# Patient Record
Sex: FEMALE | Race: WHITE | NOT HISPANIC OR LATINO | Employment: OTHER | URBAN - METROPOLITAN AREA
[De-identification: names, ages, dates, MRNs, and addresses within clinical notes are randomized per-mention and may not be internally consistent; named-entity substitution may affect disease eponyms.]

---

## 2017-02-07 ENCOUNTER — HISTORICAL (OUTPATIENT)
Dept: ADMINISTRATIVE | Facility: HOSPITAL | Age: 77
End: 2017-02-07

## 2017-02-10 ENCOUNTER — HISTORICAL (OUTPATIENT)
Dept: ADMINISTRATIVE | Facility: HOSPITAL | Age: 77
End: 2017-02-10

## 2020-05-27 ENCOUNTER — HISTORICAL (OUTPATIENT)
Dept: ADMINISTRATIVE | Facility: HOSPITAL | Age: 80
End: 2020-05-27

## 2020-05-27 LAB
T4 FREE SERPL-MCNC: 2 NG/DL (ref 0.76–1.46)
TSH SERPL-ACNC: 0 MIU/ML (ref 0.35–4.94)

## 2020-08-06 ENCOUNTER — HISTORICAL (OUTPATIENT)
Dept: ADMINISTRATIVE | Facility: HOSPITAL | Age: 80
End: 2020-08-06

## 2020-08-06 LAB — TSH SERPL-ACNC: 0 MIU/ML (ref 0.35–4.94)

## 2020-12-02 ENCOUNTER — HISTORICAL (OUTPATIENT)
Dept: ADMINISTRATIVE | Facility: HOSPITAL | Age: 80
End: 2020-12-02

## 2020-12-02 LAB
T4 FREE SERPL-MCNC: 1.13 NG/DL (ref 0.76–1.46)
TSH SERPL-ACNC: 0.07 MIU/ML (ref 0.35–4.94)

## 2021-03-02 ENCOUNTER — HISTORICAL (OUTPATIENT)
Dept: ADMINISTRATIVE | Facility: HOSPITAL | Age: 81
End: 2021-03-02

## 2021-03-02 LAB — TSH SERPL-ACNC: 0.24 MIU/ML (ref 0.35–4.94)

## 2021-03-17 ENCOUNTER — HISTORICAL (OUTPATIENT)
Dept: ADMINISTRATIVE | Facility: HOSPITAL | Age: 81
End: 2021-03-17

## 2021-03-17 LAB
ABS NEUT (OLG): 3.1 X10(3)/MCL (ref 2.1–9.2)
ALBUMIN SERPL-MCNC: 4.3 GM/DL (ref 3.4–5)
ALBUMIN/GLOB SERPL: 2.05 {RATIO} (ref 1.5–2.5)
ALP SERPL-CCNC: 57 UNIT/L (ref 38–126)
ALT SERPL-CCNC: 9 UNIT/L (ref 7–52)
APPEARANCE, UA: CLEAR
AST SERPL-CCNC: 15 UNIT/L (ref 15–37)
BACTERIA #/AREA URNS AUTO: ABNORMAL /HPF
BILIRUB SERPL-MCNC: 0.8 MG/DL (ref 0.2–1)
BILIRUB UR QL STRIP: NEGATIVE MG/DL
BILIRUBIN DIRECT+TOT PNL SERPL-MCNC: 0.2 MG/DL (ref 0–0.5)
BILIRUBIN DIRECT+TOT PNL SERPL-MCNC: 0.6 MG/DL
BUN SERPL-MCNC: 11 MG/DL (ref 7–18)
CALCIUM SERPL-MCNC: 9.5 MG/DL (ref 8.5–10)
CHLORIDE SERPL-SCNC: 104 MMOL/L (ref 98–107)
CHOLEST SERPL-MCNC: 194 MG/DL (ref 0–200)
CHOLEST/HDLC SERPL: 3.5 {RATIO}
CO2 SERPL-SCNC: 30 MMOL/L (ref 21–32)
COLOR UR: YELLOW
CREAT SERPL-MCNC: 0.84 MG/DL (ref 0.6–1.3)
ERYTHROCYTE [DISTWIDTH] IN BLOOD BY AUTOMATED COUNT: 12.6 % (ref 11.5–17)
GLOBULIN SER-MCNC: 2.1 GM/DL (ref 1.2–3)
GLUCOSE (UA): NEGATIVE MG/DL
GLUCOSE SERPL-MCNC: 96 MG/DL (ref 74–106)
HCT VFR BLD AUTO: 39.5 % (ref 37–47)
HDLC SERPL-MCNC: 56 MG/DL (ref 35–60)
HGB BLD-MCNC: 13.3 GM/DL (ref 12–16)
HGB UR QL STRIP: NEGATIVE UNIT/L
KETONES UR QL STRIP: NEGATIVE MG/DL
LDLC SERPL CALC-MCNC: 122 MG/DL (ref 0–129)
LEUKOCYTE ESTERASE UR QL STRIP: NEGATIVE UNIT/L
LYMPHOCYTES # BLD AUTO: 1.6 X10(3)/MCL (ref 0.6–3.4)
LYMPHOCYTES NFR BLD AUTO: 30 % (ref 13–40)
MCH RBC QN AUTO: 29.7 PG (ref 27–31.2)
MCHC RBC AUTO-ENTMCNC: 34 GM/DL (ref 32–36)
MCV RBC AUTO: 88 FL (ref 80–94)
MONOCYTES # BLD AUTO: 0.5 X10(3)/MCL (ref 0.1–1.3)
MONOCYTES NFR BLD AUTO: 8.9 % (ref 0.1–24)
NEUTROPHILS NFR BLD AUTO: 61.1 % (ref 47–80)
NITRITE UR QL STRIP.AUTO: NEGATIVE
PH UR STRIP: 8.5 [PH]
PLATELET # BLD AUTO: 243 X10(3)/MCL (ref 130–400)
PMV BLD AUTO: 8 FL (ref 9.4–12.4)
POTASSIUM SERPL-SCNC: 4.2 MMOL/L (ref 3.5–5.1)
PROT SERPL-MCNC: 6.4 GM/DL (ref 6.4–8.2)
PROT UR QL STRIP: NEGATIVE MG/DL
RBC # BLD AUTO: 4.48 X10(6)/MCL (ref 4.2–5.4)
RBC #/AREA URNS HPF: ABNORMAL /HPF
SODIUM SERPL-SCNC: 142 MMOL/L (ref 136–145)
SP GR UR STRIP: 1.02
SQUAMOUS EPITHELIAL, UA: ABNORMAL /LPF
T4 FREE SERPL-MCNC: 1.07 NG/DL (ref 0.76–1.46)
TRIGL SERPL-MCNC: 66 MG/DL (ref 30–150)
UROBILINOGEN UR STRIP-ACNC: 0.2 MG/DL
VLDLC SERPL CALC-MCNC: 13.2 MG/DL
WBC # SPEC AUTO: 5.2 X10(3)/MCL (ref 4.5–11.5)
WBC #/AREA URNS AUTO: ABNORMAL /[HPF]

## 2022-04-10 ENCOUNTER — HISTORICAL (OUTPATIENT)
Dept: ADMINISTRATIVE | Facility: HOSPITAL | Age: 82
End: 2022-04-10

## 2022-04-26 VITALS
OXYGEN SATURATION: 96 % | BODY MASS INDEX: 24.57 KG/M2 | SYSTOLIC BLOOD PRESSURE: 150 MMHG | HEIGHT: 64 IN | WEIGHT: 143.94 LBS | DIASTOLIC BLOOD PRESSURE: 70 MMHG

## 2022-05-25 PROBLEM — M54.9 CHRONIC MIDLINE BACK PAIN: Status: ACTIVE | Noted: 2022-05-25

## 2022-05-25 PROBLEM — R20.2 PARESTHESIAS: Status: ACTIVE | Noted: 2022-05-25

## 2022-05-25 PROBLEM — I50.9 CONGESTIVE HEART FAILURE: Status: ACTIVE | Noted: 2022-05-25

## 2022-05-25 PROBLEM — E03.9 HYPOTHYROIDISM: Status: ACTIVE | Noted: 2022-05-25

## 2022-05-25 PROBLEM — G89.29 CHRONIC MIDLINE BACK PAIN: Status: ACTIVE | Noted: 2022-05-25

## 2022-05-25 PROBLEM — K21.9 GASTROESOPHAGEAL REFLUX DISEASE: Status: ACTIVE | Noted: 2022-05-25

## 2022-06-02 PROCEDURE — 87088 URINE BACTERIA CULTURE: CPT | Performed by: NURSE PRACTITIONER

## 2022-06-07 PROBLEM — Z00.00 MEDICARE ANNUAL WELLNESS VISIT, SUBSEQUENT: Status: ACTIVE | Noted: 2022-06-07

## 2022-07-19 ENCOUNTER — HOSPITAL ENCOUNTER (EMERGENCY)
Facility: HOSPITAL | Age: 82
Discharge: HOME | End: 2022-07-19
Attending: EMERGENCY MEDICINE
Payer: MEDICARE

## 2022-07-19 VITALS
SYSTOLIC BLOOD PRESSURE: 145 MMHG | HEIGHT: 65 IN | HEART RATE: 71 BPM | TEMPERATURE: 98 F | DIASTOLIC BLOOD PRESSURE: 96 MMHG | OXYGEN SATURATION: 99 % | RESPIRATION RATE: 18 BRPM | BODY MASS INDEX: 22.66 KG/M2 | WEIGHT: 136 LBS

## 2022-07-19 DIAGNOSIS — U07.1 COVID-19: Primary | ICD-10-CM

## 2022-07-19 DIAGNOSIS — R53.1 GENERALIZED WEAKNESS: ICD-10-CM

## 2022-07-19 LAB
ALBUMIN SERPL-MCNC: 4.3 GM/DL (ref 3.4–4.8)
ALBUMIN/GLOB SERPL: 1.9 RATIO (ref 1.1–2)
ALP SERPL-CCNC: 63 UNIT/L (ref 40–150)
ALT SERPL-CCNC: 8 UNIT/L (ref 0–55)
APPEARANCE UR: CLEAR
AST SERPL-CCNC: 15 UNIT/L (ref 5–34)
BACTERIA #/AREA URNS AUTO: NORMAL /HPF
BASOPHILS # BLD AUTO: 0.02 X10(3)/MCL (ref 0–0.2)
BASOPHILS NFR BLD AUTO: 0.4 %
BILIRUB UR QL STRIP.AUTO: NEGATIVE MG/DL
BILIRUBIN DIRECT+TOT PNL SERPL-MCNC: 1 MG/DL
BUN SERPL-MCNC: 12.9 MG/DL (ref 9.8–20.1)
CALCIUM SERPL-MCNC: 10.1 MG/DL (ref 8.4–10.2)
CHLORIDE SERPL-SCNC: 104 MMOL/L (ref 98–107)
CO2 SERPL-SCNC: 25 MMOL/L (ref 23–31)
COLOR UR AUTO: YELLOW
CREAT SERPL-MCNC: 0.83 MG/DL (ref 0.55–1.02)
EOSINOPHIL # BLD AUTO: 0.12 X10(3)/MCL (ref 0–0.9)
EOSINOPHIL NFR BLD AUTO: 2.4 %
ERYTHROCYTE [DISTWIDTH] IN BLOOD BY AUTOMATED COUNT: 12.5 % (ref 11.5–17)
FLUAV AG UPPER RESP QL IA.RAPID: NOT DETECTED
FLUBV AG UPPER RESP QL IA.RAPID: NOT DETECTED
GLOBULIN SER-MCNC: 2.3 GM/DL (ref 2.4–3.5)
GLUCOSE SERPL-MCNC: 108 MG/DL (ref 82–115)
GLUCOSE UR QL STRIP.AUTO: NEGATIVE MG/DL
HCT VFR BLD AUTO: 38.7 % (ref 37–47)
HGB BLD-MCNC: 13.9 GM/DL (ref 12–16)
IMM GRANULOCYTES # BLD AUTO: 0.02 X10(3)/MCL (ref 0–0.04)
IMM GRANULOCYTES NFR BLD AUTO: 0.4 %
KETONES UR QL STRIP.AUTO: NEGATIVE MG/DL
LEUKOCYTE ESTERASE UR QL STRIP.AUTO: ABNORMAL UNIT/L
LYMPHOCYTES # BLD AUTO: 1.65 X10(3)/MCL (ref 0.6–4.6)
LYMPHOCYTES NFR BLD AUTO: 32.4 %
MCH RBC QN AUTO: 29.7 PG (ref 27–31)
MCHC RBC AUTO-ENTMCNC: 35.9 MG/DL (ref 33–36)
MCV RBC AUTO: 82.7 FL (ref 80–94)
MONOCYTES # BLD AUTO: 0.66 X10(3)/MCL (ref 0.1–1.3)
MONOCYTES NFR BLD AUTO: 13 %
NEUTROPHILS # BLD AUTO: 2.6 X10(3)/MCL (ref 2.1–9.2)
NEUTROPHILS NFR BLD AUTO: 51.4 %
NITRITE UR QL STRIP.AUTO: NEGATIVE
NRBC BLD AUTO-RTO: 0 %
PH UR STRIP.AUTO: 6 [PH]
PLATELET # BLD AUTO: 284 X10(3)/MCL (ref 130–400)
PMV BLD AUTO: 9.2 FL (ref 7.4–10.4)
POTASSIUM SERPL-SCNC: 3.6 MMOL/L (ref 3.5–5.1)
PROT SERPL-MCNC: 6.6 GM/DL (ref 5.8–7.6)
PROT UR QL STRIP.AUTO: NEGATIVE MG/DL
RBC # BLD AUTO: 4.68 X10(6)/MCL (ref 4.2–5.4)
RBC #/AREA URNS AUTO: <5 /HPF
RBC UR QL AUTO: NEGATIVE UNIT/L
SARS-COV-2 RNA RESP QL NAA+PROBE: DETECTED
SODIUM SERPL-SCNC: 140 MMOL/L (ref 136–145)
SP GR UR STRIP.AUTO: 1.01 (ref 1–1.03)
SQUAMOUS #/AREA URNS AUTO: <5 /HPF
STREP A PCR (OHS): NOT DETECTED
TROPONIN I SERPL-MCNC: <0.01 NG/ML (ref 0–0.04)
UROBILINOGEN UR STRIP-ACNC: 0.2 MG/DL
WBC # SPEC AUTO: 5.1 X10(3)/MCL (ref 4.5–11.5)
WBC #/AREA URNS AUTO: <5 /HPF

## 2022-07-19 PROCEDURE — 84484 ASSAY OF TROPONIN QUANT: CPT | Performed by: EMERGENCY MEDICINE

## 2022-07-19 PROCEDURE — 87636 SARSCOV2 & INF A&B AMP PRB: CPT | Performed by: EMERGENCY MEDICINE

## 2022-07-19 PROCEDURE — 81001 URINALYSIS AUTO W/SCOPE: CPT | Performed by: EMERGENCY MEDICINE

## 2022-07-19 PROCEDURE — 87631 RESP VIRUS 3-5 TARGETS: CPT | Performed by: EMERGENCY MEDICINE

## 2022-07-19 PROCEDURE — 80053 COMPREHEN METABOLIC PANEL: CPT | Performed by: EMERGENCY MEDICINE

## 2022-07-19 PROCEDURE — 99285 EMERGENCY DEPT VISIT HI MDM: CPT | Mod: 25,CS

## 2022-07-19 PROCEDURE — 85025 COMPLETE CBC W/AUTO DIFF WBC: CPT | Performed by: EMERGENCY MEDICINE

## 2022-07-19 PROCEDURE — 36415 COLL VENOUS BLD VENIPUNCTURE: CPT | Performed by: EMERGENCY MEDICINE

## 2022-07-19 RX ORDER — PREDNISONE 20 MG/1
20 TABLET ORAL DAILY
Qty: 5 TABLET | Refills: 0 | Status: SHIPPED | OUTPATIENT
Start: 2022-07-19 | End: 2022-07-20 | Stop reason: SDUPTHER

## 2022-07-20 RX ORDER — PREDNISONE 20 MG/1
20 TABLET ORAL DAILY
Qty: 5 TABLET | Refills: 0 | Status: SHIPPED | OUTPATIENT
Start: 2022-07-20 | End: 2022-07-25

## 2022-07-20 NOTE — ED PROVIDER NOTES
Encounter Date: 7/19/2022       History     Chief Complaint   Patient presents with    Weakness     Presents w/ weakness, body aches, and mild SOB on exertion x 2 weeks.  Dx w/ COVID 7/13. Pt. Also C/O urinary frequency and cloudy urine. Hx hypothyroidism and HLD.      Patient is a 81-year-old female presenting with complaint of generalized weakness, fatigue, body aches and intermittent shortness of breath.  Patient states her  was diagnosed with COVID-19 just a few days ago.  He is currently in the hospital at this campus.  Patient denies any chest pain.  Patient denies any nausea vomiting or abdominal pain.  Patient does states she is having some urinary frequency but denies any dysuria.  Patient denies any trauma.  No headache.  Primary care physician is Dr. Mark Anthony Case.        Review of patient's allergies indicates:  No Known Allergies  Past Medical History:   Diagnosis Date    Posey's esophagus     Bilateral plantar fasciitis     Gastroesophageal reflux disease     Hyperlipidemia     Hypertension     Hypothyroidism     Insomnia     Right shoulder pain      Past Surgical History:   Procedure Laterality Date    BREAST LUMPECTOMY Left 1985    COLONOSCOPY  04/29/2021    Destruction of Right Lens, Percutaneous Approach   02/10/2017    Discission of secondary membranous cataract (opacified posterior lens capsule and/or anterior hyaloid); laser surgery (eg, YAG laser) (1 or more stages  02/10/2017    ESOPHAGOGASTRODUODENOSCOPY      HYSTERECTOMY      THYROIDECTOMY  1985     Family History   Problem Relation Age of Onset    Brain cancer Mother     Stroke Father     Heart attack Father     Alcohol abuse Brother      Social History     Tobacco Use    Smoking status: Former Smoker    Smokeless tobacco: Never Used   Substance Use Topics    Alcohol use: Yes     Alcohol/week: 0.0 - 1.0 standard drinks    Drug use: Never     Review of Systems   Constitutional: Positive for fatigue.    HENT: Negative.    Respiratory: Positive for cough and shortness of breath. Negative for choking, chest tightness, wheezing and stridor.    Cardiovascular: Negative for chest pain and leg swelling.   Gastrointestinal: Negative.    Musculoskeletal: Negative.    Neurological: Negative.        Physical Exam     Initial Vitals [07/19/22 1956]   BP Pulse Resp Temp SpO2   138/77 77 18 97.9 °F (36.6 °C) 97 %      MAP       --         Physical Exam    Nursing note and vitals reviewed.  Constitutional: She appears well-developed and well-nourished.   Patient is alert oriented, she appears comfortable, she is in no apparent distress.   HENT:   Head: Normocephalic and atraumatic.   Neck: Neck supple.   Normal range of motion.  Cardiovascular: Normal rate, regular rhythm, normal heart sounds and intact distal pulses.   Pulmonary/Chest: Breath sounds normal. No respiratory distress. She has no wheezes. She has no rhonchi. She has no rales.   Abdominal: Abdomen is soft. There is no abdominal tenderness. There is no guarding.   Musculoskeletal:         General: Normal range of motion.      Cervical back: Normal range of motion and neck supple.     Neurological: She is alert and oriented to person, place, and time. She has normal strength.   Skin: Skin is warm and dry.   Psychiatric: She has a normal mood and affect. Her behavior is normal. Judgment and thought content normal.         ED Course   Procedures  Labs Reviewed   COMPREHENSIVE METABOLIC PANEL - Abnormal; Notable for the following components:       Result Value    Globulin 2.3 (*)     All other components within normal limits   URINALYSIS, REFLEX TO URINE CULTURE - Abnormal; Notable for the following components:    Leukocyte Esterase, UA 1+ (*)     All other components within normal limits   COVID/FLU A&B PCR - Abnormal; Notable for the following components:    SARS-CoV-2 PCR Detected (*)     All other components within normal limits   TROPONIN I - Normal   STREP GROUP  A BY PCR - Normal   URINALYSIS, MICROSCOPIC - Normal   CBC W/ AUTO DIFFERENTIAL    Narrative:     The following orders were created for panel order CBC auto differential.  Procedure                               Abnormality         Status                     ---------                               -----------         ------                     CBC with Differential[[id number]]                            Final result                 Please view results for these tests on the individual orders.   CBC WITH DIFFERENTIAL          Imaging Results          X-Ray Chest AP Portable (In process)               X-Rays:   Independently Interpreted Readings:   Other Readings:  No acute changes seen on chest x-ray    Medications - No data to display  Medical Decision Making:   Clinical Tests:   Lab Tests: Reviewed  Radiological Study: Reviewed  ED Management:  Patient is COVID positive, O2 sats stable at 97 and 98% room air.  Will DC home             ED Course as of 07/19/22 2209 Tue Jul 19, 2022 2209 Patient is in no apparent distress.  Vital signs stable.  Patient informed of COVID positive result [KG]      ED Course User Index  [KG] Harrison Palacio MD             Clinical Impression:   Final diagnoses:  [U07.1] COVID-19 (Primary)  [R53.1] Generalized weakness          ED Disposition Condition    Discharge Stable        ED Prescriptions     Medication Sig Dispense Start Date End Date Auth. Provider    predniSONE (DELTASONE) 20 MG tablet Take 1 tablet (20 mg total) by mouth once daily. for 5 days 5 tablet 7/19/2022 7/24/2022 Harrison Palacio MD        Follow-up Information     Follow up With Specialties Details Why Contact Info    Mark Anthony Guzmán MD Family Medicine In 2 days  [address]  [facility] [address]  [phone]      [facility] - Emergency Dept Emergency Medicine  As needed, If symptoms worsen [address] [address] [phone]           Harrison Palacio  MD  07/19/22 5967

## 2022-09-12 PROBLEM — Z00.00 MEDICARE ANNUAL WELLNESS VISIT, SUBSEQUENT: Status: RESOLVED | Noted: 2022-06-07 | Resolved: 2022-09-12

## 2022-10-13 PROBLEM — Z00.00 MEDICARE ANNUAL WELLNESS VISIT, SUBSEQUENT: Status: ACTIVE | Noted: 2022-10-13

## 2022-10-13 PROBLEM — Z23 IMMUNIZATION DUE: Status: ACTIVE | Noted: 2022-10-13

## 2023-01-16 PROBLEM — Z00.00 MEDICARE ANNUAL WELLNESS VISIT, SUBSEQUENT: Status: RESOLVED | Noted: 2022-10-13 | Resolved: 2023-01-16

## 2023-04-11 PROBLEM — E78.00 PURE HYPERCHOLESTEROLEMIA: Status: ACTIVE | Noted: 2023-04-11

## 2023-04-27 ENCOUNTER — OFFICE VISIT (OUTPATIENT)
Dept: URGENT CARE | Facility: CLINIC | Age: 83
End: 2023-04-27
Payer: MEDICARE

## 2023-04-27 VITALS
HEART RATE: 81 BPM | HEIGHT: 65 IN | OXYGEN SATURATION: 98 % | DIASTOLIC BLOOD PRESSURE: 84 MMHG | RESPIRATION RATE: 18 BRPM | SYSTOLIC BLOOD PRESSURE: 156 MMHG | BODY MASS INDEX: 21.99 KG/M2 | WEIGHT: 132 LBS | TEMPERATURE: 98 F

## 2023-04-27 DIAGNOSIS — M54.50 ACUTE MIDLINE LOW BACK PAIN WITHOUT SCIATICA: ICD-10-CM

## 2023-04-27 PROCEDURE — 99203 OFFICE O/P NEW LOW 30 MIN: CPT | Mod: ,,, | Performed by: NURSE PRACTITIONER

## 2023-04-27 PROCEDURE — 99203 PR OFFICE/OUTPT VISIT, NEW, LEVL III, 30-44 MIN: ICD-10-PCS | Mod: ,,, | Performed by: NURSE PRACTITIONER

## 2023-04-27 RX ORDER — HYDROCODONE BITARTRATE AND ACETAMINOPHEN 5; 325 MG/1; MG/1
1 TABLET ORAL EVERY 6 HOURS PRN
Qty: 20 TABLET | Refills: 0 | Status: SHIPPED | OUTPATIENT
Start: 2023-04-27 | End: 2023-05-02

## 2023-04-27 RX ORDER — ASPIRIN 81 MG/1
81 TABLET ORAL DAILY
COMMUNITY

## 2023-04-27 RX ORDER — METHYLPREDNISOLONE 4 MG/1
TABLET ORAL
Qty: 21 EACH | Refills: 0 | Status: SHIPPED | OUTPATIENT
Start: 2023-04-27 | End: 2023-05-02

## 2023-04-27 NOTE — PROGRESS NOTES
"Subjective:      Patient ID: Evon Roth is a 82 y.o. female.    Vitals:  height is 5' 5" (1.651 m) and weight is 59.9 kg (132 lb). Her oral temperature is 98.4 °F (36.9 °C). Her blood pressure is 156/84 (abnormal) and her pulse is 81. Her respiration is 18 and oxygen saturation is 98%.     Chief Complaint: Back Pain (Mid-back pain, muscle spasms, recurrent problem, this time for one week)    82-year-old female presents with low back pain.  Onset 2 days ago.  No recent injury or trauma.  States feels like a muscle spasm.  No dysuria, urinary frequency or urgency.  Same type of muscle spasm feeling in the low back similar to discomfort several months ago.  Norco was prescribed by PCP with relief for acute pain, no chronic history of back pain.    Musculoskeletal:  Positive for pain (low back).    Objective:     Physical Exam   Constitutional: She is oriented to person, place, and time. She appears well-developed. She is cooperative. No distress.   HENT:   Head: Normocephalic and atraumatic.   Nose: Nose normal.   Mouth/Throat: Oropharynx is clear and moist and mucous membranes are normal.   Eyes: Conjunctivae and lids are normal.   Neck: Trachea normal and phonation normal. Neck supple.   Cardiovascular: Normal rate, regular rhythm, normal heart sounds and normal pulses.   Pulmonary/Chest: Effort normal and breath sounds normal.   Abdominal: Normal appearance and bowel sounds are normal. She exhibits no mass. Soft.   Musculoskeletal:         General: No deformity.   Neurological: She is alert and oriented to person, place, and time. She has normal strength and normal reflexes. No sensory deficit.   Skin: Skin is warm, dry, intact and not diaphoretic.   Psychiatric: Her speech is normal and behavior is normal. Judgment and thought content normal.   Nursing note and vitals reviewed.    Assessment:     1. Acute midline low back pain without sciatica        Plan:   Modify activity and gentle stretching  Take " prescription medication as directed or   ibuprofen OTC as directed for pain  Follow-up with your primary care provider if symptoms worsen or persist as instructed      Acute midline low back pain without sciatica  -     HYDROcodone-acetaminophen (NORCO) 5-325 mg per tablet; Take 1 tablet by mouth every 6 (six) hours as needed for Pain.  Dispense: 20 tablet; Refill: 0  -     methylPREDNISolone (MEDROL DOSEPACK) 4 mg tablet; use as directed  Dispense: 21 each; Refill: 0

## 2023-04-27 NOTE — PATIENT INSTRUCTIONS
Modify activity and gentle stretching  Take prescription medication as directed or   ibuprofen OTC as directed for pain  Follow-up with your primary care provider if symptoms worsen or persist as instructed

## 2023-05-02 PROBLEM — Z13.31 POSITIVE DEPRESSION SCREENING: Status: ACTIVE | Noted: 2023-05-02

## 2023-05-02 PROBLEM — F33.1 MODERATE EPISODE OF RECURRENT MAJOR DEPRESSIVE DISORDER: Status: ACTIVE | Noted: 2023-05-02

## 2023-07-21 PROBLEM — F32.A DEPRESSION: Status: ACTIVE | Noted: 2023-07-21

## 2023-07-21 PROBLEM — I10 PRIMARY HYPERTENSION: Status: ACTIVE | Noted: 2023-07-21

## 2023-07-21 PROBLEM — M54.50 CHRONIC MIDLINE LOW BACK PAIN WITHOUT SCIATICA: Status: ACTIVE | Noted: 2023-07-21

## 2023-07-21 PROBLEM — F51.01 PRIMARY INSOMNIA: Status: ACTIVE | Noted: 2023-07-21

## 2023-07-21 PROBLEM — G89.29 CHRONIC MIDLINE LOW BACK PAIN WITHOUT SCIATICA: Status: ACTIVE | Noted: 2023-07-21

## 2023-07-25 PROBLEM — G47.00 INSOMNIA: Status: ACTIVE | Noted: 2023-07-25

## 2023-07-25 PROBLEM — F51.01 PRIMARY INSOMNIA: Status: RESOLVED | Noted: 2023-07-21 | Resolved: 2023-07-25

## 2023-07-25 PROBLEM — F51.01 PRIMARY INSOMNIA: Status: ACTIVE | Noted: 2023-07-25

## 2023-10-23 PROBLEM — Z00.00 MEDICARE ANNUAL WELLNESS VISIT, SUBSEQUENT: Status: RESOLVED | Noted: 2022-10-13 | Resolved: 2023-10-23

## 2023-12-14 PROBLEM — G89.29 CHRONIC RIGHT-SIDED THORACIC BACK PAIN: Status: ACTIVE | Noted: 2023-12-14

## 2023-12-14 PROBLEM — M54.6 CHRONIC RIGHT-SIDED THORACIC BACK PAIN: Status: ACTIVE | Noted: 2023-12-14

## 2024-02-18 NOTE — PROGRESS NOTES
.Fall (Fell  on left side 2/12/24)         HPI:    Patient presents for evaluation chest discomfort after a fall on 02/12/2004.  Patient tends to shuffle her feet when she walks; she stopped her foot on a bar over the sidewalk causing your to fall to her left landing on her left side.  She reports some soreness to her left ribcage and left upper chest.  She denies any shortness of breath or significant chest pain.  She denies any coughing or wheezing.  She denies any problems with taking a deep breath.        Current Outpatient Medications:     aspirin (ECOTRIN) 81 MG EC tablet, Take 81 mg by mouth once daily. Does not take all the time, Disp: , Rfl:     levothyroxine (SYNTHROID) 100 MCG tablet, Take 1 tablet (100 mcg total) by mouth before breakfast., Disp: 90 tablet, Rfl: 3    pantoprazole (PROTONIX) 40 MG tablet, TAKE 1 TABLET(40 MG) BY MOUTH EVERY DAY, Disp: 90 tablet, Rfl: 2    pravastatin (PRAVACHOL) 40 MG tablet, Take 1 tablet (40 mg total) by mouth once daily., Disp: 90 tablet, Rfl: 3    HYDROcodone-acetaminophen (NORCO)  mg per tablet, Take 1 tablet by mouth every 6 (six) hours as needed for Pain., Disp: 30 tablet, Rfl: 0    methocarbamoL (ROBAXIN) 750 MG Tab, Take 1 tablet (750 mg total) by mouth 3 (three) times daily. (Patient not taking: Reported on 2/20/2024), Disp: 30 tablet, Rfl: 1    zolpidem (AMBIEN) 10 mg Tab, Take 1 tablet (10 mg total) by mouth every evening. Take 1 tablet by mouth in evening for sleep., Disp: 20 tablet, Rfl: 0      ROS:    See HPI.      PE:    ..  Vitals:    02/20/24 1335   BP: 122/65   Pulse: (!) 55   Temp: 97.5 °F (36.4 °C)        General:  She is a pleasant well-developed well-nourished white female in no apparent distress.  She is alert and oriented.  Chest: Clear to auscultation bilaterally.    Chest wall:  Mild tenderness palpation over left anterior lateral ribcage below breast.  She also has tenderness over her left upper chest area extending to her shoulder  region.  No shoulder or clavicle deformities noted.  She can  her left arm without difficulty.  Normal in appearance.  No bruising.  CV: Regular rate rhythm without murmurs rubs or gallops.        1. Fall, initial encounter  Overview:  Patient presents for evaluation after fall on 02/12/2024.  Patient advised to exercise caution with ambulation.  Patient advised to walk more slowly and to lift up her feet when she walks.      2. Chest wall contusion, left, initial encounter  Overview:  Patient presents with contusions to left chest wall after fall on 02/12/2024.  Patient reassured that these should continue to improve and heal with time.    Patient advised to call if her discomfort should worsen or she develops any problems with breathing.  ER precautions given.      3. Primary insomnia  Overview:  Patient has not been sleeping as well recently secondary to stressors.  Patient would like to resume Ambien 10 mg; she takes 1/4 tablet daily.    02/20/2024: Patient takes Ambien infrequently; refill sent.    Orders:  -     zolpidem (AMBIEN) 10 mg Tab; Take 1 tablet (10 mg total) by mouth every evening. Take 1 tablet by mouth in evening for sleep.  Dispense: 20 tablet; Refill: 0    Other orders  -     HYDROcodone-acetaminophen (NORCO)  mg per tablet; Take 1 tablet by mouth every 6 (six) hours as needed for Pain.  Dispense: 30 tablet; Refill: 0              ..Follow up if symptoms worsen or fail to improve.

## 2024-02-20 ENCOUNTER — OFFICE VISIT (OUTPATIENT)
Dept: PRIMARY CARE CLINIC | Facility: CLINIC | Age: 84
End: 2024-02-20
Payer: MEDICARE

## 2024-02-20 VITALS
TEMPERATURE: 98 F | HEIGHT: 65 IN | BODY MASS INDEX: 22.56 KG/M2 | HEART RATE: 55 BPM | DIASTOLIC BLOOD PRESSURE: 65 MMHG | OXYGEN SATURATION: 98 % | SYSTOLIC BLOOD PRESSURE: 122 MMHG | WEIGHT: 135.38 LBS

## 2024-02-20 DIAGNOSIS — S20.212A CHEST WALL CONTUSION, LEFT, INITIAL ENCOUNTER: ICD-10-CM
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
## 2024-02-20 DIAGNOSIS — F51.01 PRIMARY INSOMNIA: ICD-10-CM

## 2024-02-20 PROBLEM — F33.1 MODERATE EPISODE OF RECURRENT MAJOR DEPRESSIVE DISORDER: Status: RESOLVED | Noted: 2023-05-02 | Resolved: 2024-02-20

## 2024-02-20 PROCEDURE — 99214 OFFICE O/P EST MOD 30 MIN: CPT | Mod: ,,, | Performed by: FAMILY MEDICINE

## 2024-02-20 RX ORDER — ZOLPIDEM TARTRATE 10 MG/1
10 TABLET ORAL NIGHTLY
Qty: 20 TABLET | Refills: 0 | Status: SHIPPED | OUTPATIENT
Start: 2024-02-20 | End: 2024-06-10

## 2024-02-20 RX ORDER — HYDROCODONE BITARTRATE AND ACETAMINOPHEN 10; 325 MG/1; MG/1
1 TABLET ORAL EVERY 6 HOURS PRN
Qty: 30 TABLET | Refills: 0 | Status: SHIPPED | OUTPATIENT
Start: 2024-02-20 | End: 2024-03-28 | Stop reason: SDUPTHER

## 2024-03-28 RX ORDER — HYDROCODONE BITARTRATE AND ACETAMINOPHEN 10; 325 MG/1; MG/1
1 TABLET ORAL EVERY 6 HOURS PRN
Qty: 20 TABLET | Refills: 0 | Status: SHIPPED | OUTPATIENT
Start: 2024-03-28 | End: 2024-06-10 | Stop reason: SDUPTHER

## 2024-05-21 ENCOUNTER — PATIENT OUTREACH (OUTPATIENT)
Dept: ADMINISTRATIVE | Facility: HOSPITAL | Age: 84
End: 2024-05-21
Payer: MEDICARE

## 2024-05-21 NOTE — PROGRESS NOTES
[facility] Rem[facility] at Twelve Months.        RE:  Follow up on a CMS Patient Health Questionnaire.      Attempted to call patient.  No answer, could not leave voice mail.    No portal.

## 2024-06-08 ENCOUNTER — OFFICE VISIT (OUTPATIENT)
Dept: URGENT CARE | Facility: CLINIC | Age: 84
End: 2024-06-08
Payer: MEDICARE

## 2024-06-08 VITALS
BODY MASS INDEX: 22.49 KG/M2 | OXYGEN SATURATION: 98 % | RESPIRATION RATE: 18 BRPM | WEIGHT: 135 LBS | HEART RATE: 73 BPM | DIASTOLIC BLOOD PRESSURE: 76 MMHG | HEIGHT: 65 IN | SYSTOLIC BLOOD PRESSURE: 165 MMHG | TEMPERATURE: 98 F

## 2024-06-08 DIAGNOSIS — M54.6 CHRONIC MIDLINE THORACIC BACK PAIN: ICD-10-CM

## 2024-06-08 DIAGNOSIS — M54.6 ACUTE MIDLINE THORACIC BACK PAIN: Primary | ICD-10-CM

## 2024-06-08 DIAGNOSIS — G89.29 CHRONIC MIDLINE THORACIC BACK PAIN: ICD-10-CM

## 2024-06-08 PROBLEM — M54.9 CHRONIC BACK PAIN: Status: ACTIVE | Noted: 2024-06-08

## 2024-06-08 PROCEDURE — 99213 OFFICE O/P EST LOW 20 MIN: CPT | Mod: ,,, | Performed by: NURSE PRACTITIONER

## 2024-06-08 NOTE — PATIENT INSTRUCTIONS
Modify activity and gentle stretching  Tylenol or ibuprofen OTC as directed for pain  Follow-up with your primary care provider if symptoms worsen or persist as instructed

## 2024-06-08 NOTE — PROGRESS NOTES
"Subjective:      Patient ID: Evon Roth is a 83 y.o. female.    Vitals:  height is 5' 5" (1.651 m) and weight is 61.2 kg (135 lb). Her temperature is 97.9 °F (36.6 °C). Her blood pressure is 165/76 (abnormal) and her pulse is 73. Her respiration is 18 and oxygen saturation is 98%.     Chief Complaint: Spasms    83 y.o. female presents to clinic with c/o muscle spasms to back starting yesterday. Pt states she's been suffering with chronic/ intermittent spasms for years.  Intermittently patient states pain reproducible with movement.  No recent injury or trauma.  No cough, chest pain, fever.  Patient states the only medication that helps is hydrocodone      Musculoskeletal:  Positive for back pain.      Objective:     Physical Exam   Constitutional: She is oriented to person, place, and time. She appears well-developed. She is cooperative. No distress.   HENT:   Head: Normocephalic and atraumatic.   Nose: Nose normal.   Mouth/Throat: Oropharynx is clear and moist and mucous membranes are normal.   Eyes: Conjunctivae and lids are normal.   Neck: Trachea normal and phonation normal. Neck supple.   Cardiovascular: Normal rate, regular rhythm, normal heart sounds and normal pulses.   Pulmonary/Chest: Effort normal and breath sounds normal.   Abdominal: Normal appearance and bowel sounds are normal. She exhibits no mass. Soft.   Musculoskeletal:         General: No deformity.        Arms:    Neurological: She is alert and oriented to person, place, and time. She has normal strength and normal reflexes. No sensory deficit.   Skin: Skin is warm, dry, intact and not diaphoretic.   Psychiatric: Her speech is normal and behavior is normal. Judgment and thought content normal.   Nursing note and vitals reviewed.      Assessment:     1. Acute midline thoracic back pain    2. Chronic midline thoracic back pain        Plan:   Patient appeared to become extremely irritable during assessment, states lets get down to business,  " hydrocodone is the only thing that helps if you will not prescribe it I am leaving.  Patient in exam room with a friend,  states I am leaving and walked out the room.  Refused Toradol injection, prescription for NSAIDs and muscle relaxers.  Patient States waste of my time   Modify activity and gentle stretching  Tylenol or ibuprofen OTC as directed for pain  Follow-up with your primary care provider if symptoms worsen or persist as instructed     Acute midline thoracic back pain    Chronic midline thoracic back pain

## 2024-06-10 ENCOUNTER — OFFICE VISIT (OUTPATIENT)
Dept: PRIMARY CARE CLINIC | Facility: CLINIC | Age: 84
End: 2024-06-10
Payer: MEDICARE

## 2024-06-10 VITALS
OXYGEN SATURATION: 97 % | HEIGHT: 65 IN | DIASTOLIC BLOOD PRESSURE: 75 MMHG | WEIGHT: 134.81 LBS | TEMPERATURE: 98 F | BODY MASS INDEX: 22.46 KG/M2 | SYSTOLIC BLOOD PRESSURE: 132 MMHG | HEART RATE: 72 BPM

## 2024-06-10 DIAGNOSIS — G89.29 CHRONIC BILATERAL THORACIC BACK PAIN: Primary | ICD-10-CM

## 2024-06-10 DIAGNOSIS — M54.6 CHRONIC BILATERAL THORACIC BACK PAIN: Primary | ICD-10-CM

## 2024-06-10 PROCEDURE — 99214 OFFICE O/P EST MOD 30 MIN: CPT | Mod: ,,, | Performed by: FAMILY MEDICINE

## 2024-06-10 RX ORDER — CYCLOBENZAPRINE HCL 5 MG
TABLET ORAL
Qty: 20 TABLET | Refills: 1 | Status: SHIPPED | OUTPATIENT
Start: 2024-06-10

## 2024-06-10 RX ORDER — HYDROCODONE BITARTRATE AND ACETAMINOPHEN 10; 325 MG/1; MG/1
1 TABLET ORAL EVERY 6 HOURS PRN
Qty: 20 TABLET | Refills: 0 | Status: SHIPPED | OUTPATIENT
Start: 2024-06-10

## 2024-06-10 NOTE — PROGRESS NOTES
"Back Pain and Shoulder Pain (Right shoulder /back pain x 4 days)         HPI:    Patient presents with right shoulder/back pain x4 days. She has been taking Tylenol without relief. She usually takes pain medication and cyclobenzaprine for this. She denies injuries or accidents since her fall 2 weeks ago.       Current Outpatient Medications   Medication Instructions    cyclobenzaprine (FLEXERIL) 5 MG tablet Take 1 tablet by mouth once to twice a day for muscle pain/spasm.    HYDROcodone-acetaminophen (NORCO)  mg per tablet 1 tablet, Oral, Every 6 hours PRN    levothyroxine (SYNTHROID) 100 mcg, Oral, Before breakfast    pantoprazole (PROTONIX) 40 mg, Oral         ROS:    She fell 2 weeks ago Saturday. She fractured her 3rd and 4th fingers; she was given a splint but she lost it.  She has been going back and forth to [place] a lot due to daugher's illness.      PE:    ..Visit Vitals  /75   Pulse 72   Temp 98.1 °F (36.7 °C)   Ht 5' 5" (1.651 m)   Wt 61.1 kg (134 lb 12.8 oz)   SpO2 97%   BMI 22.43 kg/m²        General:  She is a well-developed well-nourished elderly white female in no apparent distress.  She is alert and oriented.  She appears to have problems with remembering dates and names.  Upper back:  Scattered areas of tenderness over midthoracic spine and over bilateral upper back.  Right hand:  Mild tenderness over paroxysmal phalanx's of 3rd and 4th digits.        1. Chronic bilateral thoracic back pain  Overview:  Patient with intermittent episodes of upper back pain and muscle spasm.    Refill Norco 7.5 mg; potential risks and side effects discussed with patient.    Patient has had good affects with cyclobenzaprine; prescriptions sent.  Patient advised of potential risk for dizziness, lightheadedness, confusion and increased risk for falls.  Patient expressed understanding.  Patient advised to use extreme precautions with these medications.      Other orders  -     HYDROcodone-acetaminophen " (NORCO)  mg per tablet; Take 1 tablet by mouth every 6 (six) hours as needed for Pain.  Dispense: 20 tablet; Refill: 0  -     cyclobenzaprine (FLEXERIL) 5 MG tablet; Take 1 tablet by mouth once to twice a day for muscle pain/spasm.  Dispense: 20 tablet; Refill: 1              ..Follow up today (on 6/10/2024), or if symptoms worsen or fail to improve.       Future Appointments   Date Time Provider Department Center   7/26/2024  9:30 AM [facility]

## 2024-07-17 DIAGNOSIS — Z00.00 MEDICARE ANNUAL WELLNESS VISIT, SUBSEQUENT: Primary | ICD-10-CM

## 2024-07-17 DIAGNOSIS — E03.9 HYPOTHYROIDISM, UNSPECIFIED TYPE: ICD-10-CM

## 2024-07-17 DIAGNOSIS — I10 PRIMARY HYPERTENSION: ICD-10-CM

## 2024-07-17 DIAGNOSIS — E78.00 PURE HYPERCHOLESTEROLEMIA: ICD-10-CM

## 2024-07-26 PROBLEM — F33.42 RECURRENT MAJOR DEPRESSIVE DISORDER, IN FULL REMISSION: Status: ACTIVE | Noted: 2023-05-02

## 2024-07-26 PROBLEM — F33.42 RECURRENT MAJOR DEPRESSIVE DISORDER, IN FULL REMISSION: Status: RESOLVED | Noted: 2023-05-02 | Resolved: 2024-02-20

## 2024-07-26 NOTE — PROGRESS NOTES
..Annual Exam (Referral for hearing aids)       HISTORY OF PRESENT ILLNESS    This 83 y.o. female patient presents to the clinic today for a wellness CPX.  She has been feeling well.   She will be selling her house and moving to [place] where her children live.  Her right fingers she previously fractured are still sore.   She has been sleeping well; she stopped the Ambien and has been taking Tylenol PM.   Her appetite has been good.  She does not exercise; too hot to walk.  She does not smoke; she has not smoked in 23 years.    She quit drinking wine.    She has 3 cups of coffee a day.    Last colonoscopy 2021.  She does not see a gyn or do mammograms       The patient's Health Maintenance was reviewed and the following appears to be due at this time:   Health Maintenance Due   Topic Date Due    Sign Pain Contract  Never done    RSV Vaccine (Age 60+ and Pregnant patients) (1 - 1-dose 60+ series) Never done    DEXA Scan  08/16/2021    COVID-19 Vaccine (3 - 2023-24 season) 09/01/2023       ..  Past Medical History:   Diagnosis Date    Posey's esophagus     Bilateral plantar fasciitis     Gastroesophageal reflux disease     Hypothyroidism     Unspecified sensorineural hearing loss           ..  Past Surgical History:   Procedure Laterality Date    BREAST LUMPECTOMY Left 1985    COLONOSCOPY  04/29/2021    Destruction of Right Lens, Percutaneous Approach   02/10/2017    Discission of secondary membranous cataract (opacified posterior lens capsule and/or anterior hyaloid); laser surgery (eg, YAG laser) (1 or more stages  02/10/2017    ESOPHAGOGASTRODUODENOSCOPY      HYSTERECTOMY      THYROIDECTOMY  1985          Current Outpatient Medications   Medication Instructions    cyclobenzaprine (FLEXERIL) 5 MG tablet Take 1 tablet by mouth once to twice a day for muscle pain/spasm.    HYDROcodone-acetaminophen (NORCO)  mg per tablet 1 tablet, Oral, Every 6 hours PRN    levothyroxine (SYNTHROID) 100 mcg, Oral, Before  breakfast    pantoprazole (PROTONIX) 40 mg, Oral, Daily         ..  Social History     Socioeconomic History    Marital status:     Number of children: 5   Occupational History    Occupation: retired   Tobacco Use    Smoking status: Former    Smokeless tobacco: Never   Substance and Sexual Activity    Alcohol use: Not Currently     Alcohol/week: 0.0 - 1.0 standard drinks of alcohol    Drug use: Never    Sexual activity: Not Currently     Social Determinants of Health     Financial Resource Strain: Low Risk  (7/30/2024)    Overall Financial Resource Strain (CARDIA)     Difficulty of Paying Living Expenses: Not hard at all   Food Insecurity: No Food Insecurity (7/30/2024)    Hunger Vital Sign     Worried About Running Out of Food in the Last Year: Never true     Ran Out of Food in the Last Year: Never true   Transportation Needs: No Transportation Needs (7/30/2024)    TRANSPORTATION NEEDS     Transportation : No   Recent Concern: Transportation Needs - Unmet Transportation Needs (7/30/2024)    TRANSPORTATION NEEDS     Transportation : Yes, it has kept me from medical appointments or from getting my medications.   Physical Activity: Insufficiently Active (7/30/2024)    Exercise Vital Sign     Days of Exercise per Week: 3 days     Minutes of Exercise per Session: 30 min   Stress: Stress Concern Present (7/30/2024)    [employer] - Occupational Stress Questionnaire     Feeling of Stress : To some extent   Housing Stability: Low Risk  (7/30/2024)    Housing Stability Vital Sign     Unable to Pay for Housing in the Last Year: No     Homeless in the Last Year: No          ..  Family History   Problem Relation Name Age of Onset    Brain cancer Mother      Stroke Father      Heart attack Father      Alcohol abuse Brother            ..  Review of patient's allergies indicates:   Allergen Reactions    Prednisone Nausea Only          ..  Immunization History   Administered Date(s) Administered  "   COVID-19, MRNA, LN-S, PF (Pfizer) (Purple Cap) 08/25/2021, 09/15/2021    Influenza - High Dose - PF (65 years and older) 01/10/2013, 09/15/2016, 11/10/2017, 11/15/2018, 12/02/2020, 11/02/2021    Influenza - Quadrivalent - High Dose - PF (65 years and older) 10/13/2022, 12/14/2023    Influenza - Trivalent - PF (ADULT) 01/10/2013, 09/15/2016, 11/10/2017, 11/15/2018, 12/02/2020, 11/02/2021    Pneumococcal Conjugate - 20 Valent 07/25/2023    Zoster Recombinant 06/05/2021, 08/12/2021          REVIEW OF SYSTEMS:    GENERAL:   no unexplained wt gain/loss,  fever, fatigue, chills, night sweats or weakness  HEENT: no sore throat, ear pain, sinus pressure, nasal congestion, or rhinorrhea, + hearing loss  VISION: no vision changes, + decreased vision,  glaucoma, cataracts  CARDIAC: no chest pain, palpitations, dyspnea on exertion, orthopnea  RESPIRATORY: + occasional cough, wheezing, sputum production, or SOB  GI: no abdominal pain, n&v, no heartburn, + GERD/history of Posey's, no constipation, diarrhea,  blood in stool or  (--)family history of colon cancer    : no dysuria, hematuria, + frequency,  urgency, mild stress incontinence,  vaginal discharge,  abnormal vaginal bleeding  MUSC/SKEL:  no myalgia, weakness, edema, + arthralgias: right fingers, or joint effusion, + chronic mid back pain  SKIN:  No rash, hives, itching or sores  NEURO:  No headaches, numbness,  tingling, weakness, or dizziness  PSYCH:  No anxiety, + depression,  irritability,  suicidal ideation or hallucinations  ENDO:  No polyuria, polydipsia,  polyphagia  HEME:  + bruising,  lymphadenopathy, bleeding disorders, no anemia       PHYSICAL EXAM:    Visit Vitals  /64   Pulse 65   Temp 97.4 °F (36.3 °C)   Ht 5' 5" (1.651 m)   Wt 63.2 kg (139 lb 4.8 oz)   SpO2 97%   BMI 23.18 kg/m²       GENERAL:  Well-developed well-nourished elderly white female in NAD, alert and oriented x 3  SKIN:  no rash or abnormal appearing skin lesions  HEENT:  SEAN, " EOMI, mouth wnl, throat wnl, EAC and TM wnl bilaterally  NECK:  FROM, no lymphadenopathy, no thyroid abnormalities palpable  CHEST:  CTA bilaterally no wheezes, crackles or rubs  CARDIAC:  RRR, no murmurs audible  ABDOMEN:  Soft, nontender, nondistended, NBSx4, no rebound or guarding, no HSM  EXTREMITIES:  no clubbing, cyanosis, or edema.  joints wnl. +2 DP/PT pulse bilaterally  NEURO:  no sensory or motor deficits noted. CN II-XII intact. Gait wnl.           ASSESSMENT AND PLAN     1. Medicare annual wellness visit, subsequent  Overview:  07/30/2024:   Patient presents for wellness examination.    She has been feeling well.    She still has back pain; she takes cyclobenzaprine and Norco as needed.  Her reflux is well controlled.  Patient reports bilateral hearing loss; refer to [facility] for evaluation.  Patient encouraged to exercise as tolerated.  Last colonoscopy 2021.    Patient does not see a gyn do mammograms.  Patient is moving to [place] to be by her 3 children.  She declines any lab work at this time.      2. Primary hypertension  Overview:  Her blood pressure is well controlled without medication.    07/30/2024:  Patient's blood pressure is doing well without medication.      3. Pure hypercholesterolemia  Overview:  Lipid profile 10/2022: Total cholesterol 151, HDL 50, triglycerides 94 and LDL 83.    Patient has been compliant with medications; refills sent.    Continue low-cholesterol/low-fat diet.    Lipid profile pending.    07/30/2024:  Patient advised to follow a low-cholesterol/low-fat diet.    Patient declines to do a lipid profile.  Patient declines any medication to lower her cholesterol.      4. Hypothyroidism, unspecified type  Overview:  TSH 10/2022:  0.0325.  Patient requested not to adjust her medication dosage.    07/25/2023:   TSH pending.    07/30/2024:  Continue levothyroxine 100 mcg; refills sent.    Patient declines to do TSH at this time.    Orders:  -      levothyroxine (SYNTHROID) 100 MCG tablet; Take 1 tablet (100 mcg total) by mouth before breakfast.  Dispense: 90 tablet; Refill: 3    5. Gastroesophageal reflux disease, unspecified whether esophagitis present  Overview:  Well controlled; continue pantoprazole.  Refills sent.    07/30/2024:  Her reflux is well controlled with pantoprazole; refills sent.    Patient with history of Barretts esophagus; this has resolved.  Patient denies any dysphagia or odynophagia.    Orders:  -     pantoprazole (PROTONIX) 40 MG tablet; Take 1 tablet (40 mg total) by mouth once daily.  Dispense: 90 tablet; Refill: 3    6. Chronic bilateral thoracic back pain  Overview:  Patient with intermittent episodes of upper back pain and muscle spasm.    Refill Norco 7.5 mg; potential risks and side effects discussed with patient.    Patient has had good affects with cyclobenzaprine; prescriptions sent.  Patient advised of potential risk for dizziness, lightheadedness, confusion and increased risk for falls.  Patient expressed understanding.  Patient advised to use extreme precautions with these medications.    07/30/2024: Patient continues to have back pain at times.    Continue cyclobenzaprine and Norco as needed; refills sent.  Potential risks and side effects discussed with patient.      7. Primary insomnia  Overview:  Patient has not been sleeping as well recently secondary to stressors.  Patient would like to resume Ambien 10 mg; she takes 1/4 tablet daily.    02/20/2024: Patient takes Ambien infrequently; refill sent.    07/30/2024:  Patient discontinued Ambien due to dizziness.    She has been sleeping well; she takes Tylenol PM.      8. Recurrent major depressive disorder, in full remission  Overview:  Diagnosis/treatment discussed with patient.    Patient denies any suicidal thoughts.  Patient reports persistent sadness, crying spells, fatigue, excessive sleep and loss of appetite.  She has occasional feelings of hopelessness,  helplessness and guilt.  She has taken antidepressant medication previously.    Start citalopram 10 mg daily; patient advised it will take 10-14 days to notice a difference and 4-6 weeks for full effect.  ER precautions given.    Patient is to call me in 1 month with an update.    07/25/2023:  Patient states her depression has been doing well.  She denies any sadness, depression or crying spells.  Continue citalopram; refills sent.    07/30/2024: Patient states her depression has been doing well overall.  She occasionally feels sad secondary to current situation with moving back to [place] and not having a place of her own to live.  She denies any crying spells, helplessness, hopelessness or suicidal thoughts.  ER precautions given.      9. Sensorineural hearing loss (SNHL) of both ears  Overview:  07/30/2024: Patient with history of bilateral hearing loss; she requests referral for evaluation.    Refer to [facility].    Orders:  -     Ambulatory referral/consult to Audiology; Future; Expected date: 07/30/2024    Other orders  -     cyclobenzaprine (FLEXERIL) 5 MG tablet; Take 1 tablet by mouth once to twice a day for muscle pain/spasm.  Dispense: 20 tablet; Refill: 1  -     HYDROcodone-acetaminophen (NORCO)  mg per tablet; Take 1 tablet by mouth every 6 (six) hours as needed for Pain.  Dispense: 20 tablet; Refill: 0         ..Follow up in about 1 year (around 7/30/2025) for Wellness.     No future appointments.

## 2024-07-30 ENCOUNTER — OFFICE VISIT (OUTPATIENT)
Dept: PRIMARY CARE CLINIC | Facility: CLINIC | Age: 84
End: 2024-07-30
Payer: MEDICARE

## 2024-07-30 VITALS
DIASTOLIC BLOOD PRESSURE: 64 MMHG | BODY MASS INDEX: 23.21 KG/M2 | OXYGEN SATURATION: 97 % | SYSTOLIC BLOOD PRESSURE: 132 MMHG | WEIGHT: 139.31 LBS | HEART RATE: 65 BPM | HEIGHT: 65 IN | TEMPERATURE: 97 F

## 2024-07-30 DIAGNOSIS — F33.42 RECURRENT MAJOR DEPRESSIVE DISORDER, IN FULL REMISSION: ICD-10-CM

## 2024-07-30 DIAGNOSIS — K21.9 GASTROESOPHAGEAL REFLUX DISEASE, UNSPECIFIED WHETHER ESOPHAGITIS PRESENT: ICD-10-CM

## 2024-07-30 DIAGNOSIS — Z00.00 MEDICARE ANNUAL WELLNESS VISIT, SUBSEQUENT: Primary | ICD-10-CM

## 2024-07-30 DIAGNOSIS — I10 PRIMARY HYPERTENSION: ICD-10-CM

## 2024-07-30 DIAGNOSIS — F51.01 PRIMARY INSOMNIA: ICD-10-CM

## 2024-07-30 DIAGNOSIS — G89.29 CHRONIC BILATERAL THORACIC BACK PAIN: ICD-10-CM

## 2024-07-30 DIAGNOSIS — M54.6 CHRONIC BILATERAL THORACIC BACK PAIN: ICD-10-CM

## 2024-07-30 DIAGNOSIS — E03.9 HYPOTHYROIDISM, UNSPECIFIED TYPE: ICD-10-CM

## 2024-07-30 DIAGNOSIS — E78.00 PURE HYPERCHOLESTEROLEMIA: ICD-10-CM

## 2024-07-30 DIAGNOSIS — H90.3 SENSORINEURAL HEARING LOSS (SNHL) OF BOTH EARS: ICD-10-CM

## 2024-07-30 RX ORDER — PANTOPRAZOLE SODIUM 40 MG/1
40 TABLET, DELAYED RELEASE ORAL DAILY
Qty: 90 TABLET | Refills: 3 | Status: SHIPPED | OUTPATIENT
Start: 2024-07-30 | End: 2025-07-25

## 2024-07-30 RX ORDER — LEVOTHYROXINE SODIUM 100 UG/1
100 TABLET ORAL
Qty: 90 TABLET | Refills: 3 | Status: SHIPPED | OUTPATIENT
Start: 2024-07-30 | End: 2025-07-30

## 2024-07-30 RX ORDER — CYCLOBENZAPRINE HCL 5 MG
TABLET ORAL
Qty: 20 TABLET | Refills: 1 | Status: SHIPPED | OUTPATIENT
Start: 2024-07-30

## 2024-07-30 RX ORDER — HYDROCODONE BITARTRATE AND ACETAMINOPHEN 10; 325 MG/1; MG/1
1 TABLET ORAL EVERY 6 HOURS PRN
Qty: 20 TABLET | Refills: 0 | Status: SHIPPED | OUTPATIENT
Start: 2024-07-30

## 2025-01-16 ENCOUNTER — TELEPHONE (OUTPATIENT)
Dept: PRIMARY CARE CLINIC | Facility: CLINIC | Age: 85
End: 2025-01-16
Payer: MEDICARE

## 2025-01-16 NOTE — TELEPHONE ENCOUNTER
----- Message from Mark Anthony Guzmán MD sent at 1/15/2025  9:15 AM CST -----  Advise Ms Barnard that I do not know any physicians in that area.  I wish her the best of luck. It  has been a pleasure seeing her.  ----- Message -----  From: Maddy Aldridge LPN  Sent: 1/15/2025   8:03 AM CST  To: Mark Anthony Guzmán MD      ----- Message -----  From: Kiera Marshall  Sent: 1/14/2025   4:11 PM CST  To: Ramirez LUCIA Staff    .Who Called: Evon Roth        Preferred Method of Contact: Phone Call  Patient's Preferred Phone Number on File: [phone]   Best Call Back Number, if different:[phone]  Additional Information: pt move to [place] asking pcp recommends to her

## 2025-02-06 DIAGNOSIS — G89.29 CHRONIC BILATERAL THORACIC BACK PAIN: Primary | ICD-10-CM

## 2025-02-06 DIAGNOSIS — M54.6 CHRONIC BILATERAL THORACIC BACK PAIN: Primary | ICD-10-CM

## 2025-02-06 RX ORDER — HYDROCODONE BITARTRATE AND ACETAMINOPHEN 10; 325 MG/1; MG/1
1 TABLET ORAL EVERY 6 HOURS PRN
Qty: 20 TABLET | Refills: 0 | Status: SHIPPED | OUTPATIENT
Start: 2025-02-06

## 2025-03-11 PROBLEM — E78.49 OTHER HYPERLIPIDEMIA: Status: ACTIVE | Noted: 2023-04-11

## 2025-03-11 NOTE — PROGRESS NOTES
..Follow-up (States needs referral for primary No longing having pain or palpitations)       HISTORY OF PRESENT ILLNESS    Patient presents for follow-up prior to moving to [place].  She has been staying with her 3 children.    Pt had a cardiac event in late September/October in 2024. She was in a hospital in [place]. She had an angiogram; she did not have any blockage.  She is diagnosed with dilated cardiomyopathy.  He started her on Entresto and baby ASA and discontinued metoprolol.  She denies any issues since that time.  She has been feeling well  She has been sleeping well.  Her appetite is good.  She denies any recent illnesses.        The patient's Health Maintenance was reviewed and the following appears to be due at this time:   Health Maintenance Due   Topic Date Due    RSV Vaccine (Age 60+ and Pregnant patients) (1 - 1-dose 75+ series) Never done    DEXA Scan  08/16/2021    Influenza Vaccine (1) 09/01/2024    COVID-19 Vaccine (3 - 2024-25 season) 09/01/2024       ..  Past Medical History:   Diagnosis Date    Posey's esophagus     Bilateral plantar fasciitis     Gastroesophageal reflux disease     Hypothyroidism     Myocardial infarction 10/2024          ..  Past Surgical History:   Procedure Laterality Date    BREAST LUMPECTOMY Left 1985    COLONOSCOPY  04/29/2021    Destruction of Right Lens, Percutaneous Approach   02/10/2017    Discission of secondary membranous cataract (opacified posterior lens capsule and/or anterior hyaloid); laser surgery (eg, YAG laser) (1 or more stages  02/10/2017    ESOPHAGOGASTRODUODENOSCOPY      HYSTERECTOMY      LEFT HEART CATHETERIZATION  10/2024    THYROIDECTOMY  1985          Current Outpatient Medications   Medication Instructions    aspirin 81 mg    cyclobenzaprine (FLEXERIL) 5 MG tablet Take 1 tablet by mouth once to twice a day for muscle pain/spasm.    ENTRESTO 24-26 mg per tablet 1 tablet, 2 times daily    HYDROcodone-acetaminophen (NORCO)  mg per tablet  "1 tablet, Oral, Every 6 hours PRN    levothyroxine (SYNTHROID) 100 mcg, Oral, Before breakfast    pantoprazole (PROTONIX) 40 mg, Oral, Daily    pravastatin (PRAVACHOL) 20 mg         ..Social History[1]       ..  Family History   Problem Relation Name Age of Onset    Brain cancer Mother      Stroke Father      Heart attack Father      Alcohol abuse Brother            ..  Review of patient's allergies indicates:   Allergen Reactions    Prednisone Nausea Only          ..  Immunization History   Administered Date(s) Administered    COVID-19, MRNA, LN-S, PF (Pfizer) (Purple Cap) 08/25/2021, 09/15/2021    Influenza - Quadrivalent - High Dose - PF (65 years and older) 10/13/2022, 12/14/2023    Influenza - Trivalent - Fluarix, Flulaval, Fluzone, Afluria - PF 01/10/2013, 09/15/2016, 11/10/2017, 11/15/2018, 12/02/2020, 11/02/2021    Influenza - Trivalent - Fluzone High Dose - PF (65 years and older) 01/10/2013, 09/15/2016, 11/10/2017, 11/15/2018, 12/02/2020, 11/02/2021    Pneumococcal Conjugate - 20 Valent 07/25/2023    Zoster Recombinant 06/05/2021, 08/12/2021          REVIEW OF SYSTEMS:    Her reflux has been doing well on pantoprazole.   She has back spasm at times depending on level of activity.  She has been picking up and packing boxes.  She has also been traveling a lot.            PHYSICAL EXAM:    Visit Vitals  /72   Pulse 68   Temp 98.5 °F (36.9 °C)   Ht 5' 5" (1.651 m)   Wt 61.2 kg (135 lb)   SpO2 97%   BMI 22.47 kg/m²       GENERAL:  She is a well-developed well-nourished elderly female in NAD, alert and oriented x 3.  She has a pleasant affect.  She interacts well.  Chest:  Clear to auscultation bilaterally.    CV: Regular rate and rhythm without murmurs rubs gallops.    Bilateral lower extremities: Without edema.             ASSESSMENT AND PLAN     1. Primary hypertension  Overview:  Her blood pressure is well controlled without medication.    07/30/2024:  Patient's blood pressure is doing well without " medication.    Assessment & Plan:  Her blood pressure is controlled; continue Entresto per cardiologist in [place].      2. Gastroesophageal reflux disease, unspecified whether esophagitis present  Overview:  Well controlled; continue pantoprazole.  Refills sent.    07/30/2024:  Her reflux is well controlled with pantoprazole; refills sent.    Patient with history of Barretts esophagus; this has resolved.  Patient denies any dysphagia or odynophagia.    Assessment & Plan:  Patient states her reflux has been well controlled; continue Protonix.      3. Chronic bilateral thoracic back pain  Overview:  Patient with intermittent episodes of upper back pain and muscle spasm.    Refill Norco 7.5 mg; potential risks and side effects discussed with patient.    Patient has had good affects with cyclobenzaprine; prescriptions sent.  Patient advised of potential risk for dizziness, lightheadedness, confusion and increased risk for falls.  Patient expressed understanding.  Patient advised to use extreme precautions with these medications.    07/30/2024: Patient continues to have back pain at times.    Continue cyclobenzaprine and Norco as needed; refills sent.  Potential risks and side effects discussed with patient.    Assessment & Plan:  Patient has a occasional back discomfort especially with recent moving and packing boxes as well as traveling.  Continue Flexeril as needed.    Prescription for hydrocodone sent; patient takes infrequently for severe pain.  Patient aware of potential risks and side effects.    Orders:  -     HYDROcodone-acetaminophen (NORCO)  mg per tablet; Take 1 tablet by mouth every 6 (six) hours as needed for Pain.  Dispense: 20 tablet; Refill: 0    4. Primary insomnia  Overview:  Patient has not been sleeping as well recently secondary to stressors.  Patient would like to resume Ambien 10 mg; she takes 1/4 tablet daily.    02/20/2024: Patient takes Ambien infrequently; refill sent.    07/30/2024:   Patient discontinued Ambien due to dizziness.    She has been sleeping well; she takes Tylenol PM.    Assessment & Plan:  Patient has been sleeping well.      5. Recurrent major depressive disorder, in full remission  Overview:  Diagnosis/treatment discussed with patient.    Patient denies any suicidal thoughts.  Patient reports persistent sadness, crying spells, fatigue, excessive sleep and loss of appetite.  She has occasional feelings of hopelessness, helplessness and guilt.  She has taken antidepressant medication previously.    Start citalopram 10 mg daily; patient advised it will take 10-14 days to notice a difference and 4-6 weeks for full effect.  ER precautions given.    Patient is to call me in 1 month with an update.    07/25/2023:  Patient states her depression has been doing well.  She denies any sadness, depression or crying spells.  Continue citalopram; refills sent.    07/30/2024: Patient states her depression has been doing well overall.  She occasionally feels sad secondary to current situation with moving back to [place] and not having a place of her own to live.  She denies any crying spells, helplessness, hopelessness or suicidal thoughts.  ER precautions given.    Assessment & Plan:  Patient states she has a occasional sadness but talks herself out of it.  Overall, she feels her depression is doing well.  She is happy that she will be living around her children.      6. Dilated cardiomyopathy  Overview:  03/12/2025:  Patient diagnosed last year per cardiologist in [place].  She is on Entresto.  She denies any chest pain, pressure, tightness, palpitations or shortness of breath.  She denies any dizziness, lightheadedness or peripheral edema.           ..Follow up if symptoms worsen or fail to improve.     No future appointments.                   [1]   Social History  Socioeconomic History    Marital status:     Number of children: 5   Occupational History    Occupation: retired   Tobacco  Use    Smoking status: Former    Smokeless tobacco: Never   Substance and Sexual Activity    Alcohol use: Not Currently     Alcohol/week: 0.0 - 1.0 standard drinks of alcohol    Drug use: Never    Sexual activity: Not Currently     Social Drivers of Health     Financial Resource Strain: Low Risk  (7/30/2024)    Overall Financial Resource Strain (CARDIA)     Difficulty of Paying Living Expenses: Not hard at all   Food Insecurity: No Food Insecurity (7/30/2024)    Hunger Vital Sign     Worried About Running Out of Food in the Last Year: Never true     Ran Out of Food in the Last Year: Never true   Transportation Needs: No Transportation Needs (7/30/2024)    TRANSPORTATION NEEDS     Transportation : No   Recent Concern: Transportation Needs - Unmet Transportation Needs (7/30/2024)    TRANSPORTATION NEEDS     Transportation : Yes, it has kept me from medical appointments or from getting my medications.   Physical Activity: Insufficiently Active (7/30/2024)    Exercise Vital Sign     Days of Exercise per Week: 3 days     Minutes of Exercise per Session: 30 min   Stress: Stress Concern Present (7/30/2024)    [employer] - Occupational Stress Questionnaire     Feeling of Stress : To some extent   Housing Stability: Unknown (7/30/2024)    Housing Stability Vital Sign     Unable to Pay for Housing in the Last Year: No     Homeless in the Last Year: No

## 2025-03-12 ENCOUNTER — OFFICE VISIT (OUTPATIENT)
Dept: PRIMARY CARE CLINIC | Facility: CLINIC | Age: 85
End: 2025-03-12
Payer: MEDICARE

## 2025-03-12 VITALS
HEIGHT: 65 IN | BODY MASS INDEX: 22.49 KG/M2 | TEMPERATURE: 99 F | DIASTOLIC BLOOD PRESSURE: 72 MMHG | WEIGHT: 135 LBS | HEART RATE: 68 BPM | OXYGEN SATURATION: 97 % | SYSTOLIC BLOOD PRESSURE: 139 MMHG

## 2025-03-12 DIAGNOSIS — F33.42 RECURRENT MAJOR DEPRESSIVE DISORDER, IN FULL REMISSION: ICD-10-CM

## 2025-03-12 DIAGNOSIS — G89.29 CHRONIC BILATERAL THORACIC BACK PAIN: ICD-10-CM

## 2025-03-12 DIAGNOSIS — I10 PRIMARY HYPERTENSION: Primary | ICD-10-CM

## 2025-03-12 DIAGNOSIS — I42.0 DILATED CARDIOMYOPATHY: ICD-10-CM

## 2025-03-12 DIAGNOSIS — M54.6 CHRONIC BILATERAL THORACIC BACK PAIN: ICD-10-CM

## 2025-03-12 DIAGNOSIS — F51.01 PRIMARY INSOMNIA: ICD-10-CM

## 2025-03-12 DIAGNOSIS — K21.9 GASTROESOPHAGEAL REFLUX DISEASE, UNSPECIFIED WHETHER ESOPHAGITIS PRESENT: ICD-10-CM

## 2025-03-12 RX ORDER — NAPROXEN SODIUM 220 MG/1
81 TABLET, FILM COATED ORAL
COMMUNITY
Start: 2024-12-30

## 2025-03-12 RX ORDER — SACUBITRIL AND VALSARTAN 24; 26 MG/1; MG/1
1 TABLET, FILM COATED ORAL 2 TIMES DAILY
COMMUNITY
Start: 2025-02-01

## 2025-03-12 RX ORDER — HYDROCODONE BITARTRATE AND ACETAMINOPHEN 10; 325 MG/1; MG/1
1 TABLET ORAL EVERY 6 HOURS PRN
Qty: 20 TABLET | Refills: 0 | Status: SHIPPED | OUTPATIENT
Start: 2025-03-12

## 2025-03-12 RX ORDER — PRAVASTATIN SODIUM 20 MG/1
20 TABLET ORAL
COMMUNITY

## 2025-03-12 NOTE — ASSESSMENT & PLAN NOTE
Patient states she has a occasional sadness but talks herself out of it.  Overall, she feels her depression is doing well.  She is happy that she will be living around her children.

## 2025-03-12 NOTE — ASSESSMENT & PLAN NOTE
Patient has a occasional back discomfort especially with recent moving and packing boxes as well as traveling.  Continue Flexeril as needed.    Prescription for hydrocodone sent; patient takes infrequently for severe pain.  Patient aware of potential risks and side effects.